# Patient Record
Sex: MALE | Race: WHITE | NOT HISPANIC OR LATINO | Employment: FULL TIME | URBAN - METROPOLITAN AREA
[De-identification: names, ages, dates, MRNs, and addresses within clinical notes are randomized per-mention and may not be internally consistent; named-entity substitution may affect disease eponyms.]

---

## 2023-07-07 ENCOUNTER — HOSPITAL ENCOUNTER (OUTPATIENT)
Dept: DATA CONVERSION | Facility: HOSPITAL | Age: 43
End: 2023-07-08
Attending: ORTHOPAEDIC SURGERY | Admitting: ORTHOPAEDIC SURGERY
Payer: COMMERCIAL

## 2023-07-07 DIAGNOSIS — M17.11 UNILATERAL PRIMARY OSTEOARTHRITIS, RIGHT KNEE: ICD-10-CM

## 2023-07-07 DIAGNOSIS — M17.10 UNILATERAL PRIMARY OSTEOARTHRITIS, UNSPECIFIED KNEE: ICD-10-CM

## 2023-07-08 LAB
ANION GAP IN SER/PLAS: 11 MMOL/L (ref 10–20)
BASOPHILS (10*3/UL) IN BLOOD BY AUTOMATED COUNT: 0.01 X10E9/L (ref 0–0.1)
BASOPHILS/100 LEUKOCYTES IN BLOOD BY AUTOMATED COUNT: 0.1 % (ref 0–2)
CALCIUM (MG/DL) IN SER/PLAS: 8.7 MG/DL (ref 8.6–10.3)
CARBON DIOXIDE, TOTAL (MMOL/L) IN SER/PLAS: 25 MMOL/L (ref 21–32)
CHLORIDE (MMOL/L) IN SER/PLAS: 105 MMOL/L (ref 98–107)
CREATININE (MG/DL) IN SER/PLAS: 1.06 MG/DL (ref 0.5–1.3)
EOSINOPHILS (10*3/UL) IN BLOOD BY AUTOMATED COUNT: 0.01 X10E9/L (ref 0–0.7)
EOSINOPHILS/100 LEUKOCYTES IN BLOOD BY AUTOMATED COUNT: 0.1 % (ref 0–6)
ERYTHROCYTE DISTRIBUTION WIDTH (RATIO) BY AUTOMATED COUNT: 11.9 % (ref 11.5–14.5)
ERYTHROCYTE MEAN CORPUSCULAR HEMOGLOBIN CONCENTRATION (G/DL) BY AUTOMATED: 33.3 G/DL (ref 32–36)
ERYTHROCYTE MEAN CORPUSCULAR VOLUME (FL) BY AUTOMATED COUNT: 87 FL (ref 80–100)
ERYTHROCYTES (10*6/UL) IN BLOOD BY AUTOMATED COUNT: 4.71 X10E12/L (ref 4.5–5.9)
GFR MALE: 90 ML/MIN/1.73M2
GLUCOSE (MG/DL) IN SER/PLAS: 121 MG/DL (ref 74–99)
HEMATOCRIT (%) IN BLOOD BY AUTOMATED COUNT: 40.8 % (ref 41–52)
HEMOGLOBIN (G/DL) IN BLOOD: 13.6 G/DL (ref 13.5–17.5)
IMMATURE GRANULOCYTES/100 LEUKOCYTES IN BLOOD BY AUTOMATED COUNT: 0.4 % (ref 0–0.9)
LEUKOCYTES (10*3/UL) IN BLOOD BY AUTOMATED COUNT: 16.5 X10E9/L (ref 4.4–11.3)
LYMPHOCYTES (10*3/UL) IN BLOOD BY AUTOMATED COUNT: 1.11 X10E9/L (ref 1.2–4.8)
LYMPHOCYTES/100 LEUKOCYTES IN BLOOD BY AUTOMATED COUNT: 6.7 % (ref 13–44)
MONOCYTES (10*3/UL) IN BLOOD BY AUTOMATED COUNT: 1.1 X10E9/L (ref 0.1–1)
MONOCYTES/100 LEUKOCYTES IN BLOOD BY AUTOMATED COUNT: 6.7 % (ref 2–10)
NEUTROPHILS (10*3/UL) IN BLOOD BY AUTOMATED COUNT: 14.19 X10E9/L (ref 1.2–7.7)
NEUTROPHILS/100 LEUKOCYTES IN BLOOD BY AUTOMATED COUNT: 86 % (ref 40–80)
PLATELETS (10*3/UL) IN BLOOD AUTOMATED COUNT: 261 X10E9/L (ref 150–450)
POTASSIUM (MMOL/L) IN SER/PLAS: 4.1 MMOL/L (ref 3.5–5.3)
SODIUM (MMOL/L) IN SER/PLAS: 137 MMOL/L (ref 136–145)
UREA NITROGEN (MG/DL) IN SER/PLAS: 12 MG/DL (ref 6–23)

## 2023-09-30 NOTE — H&P
History & Physical Reviewed:   I have reviewed the History and Physical dated:  02-Jul-2023   History and Physical reviewed and relevant findings noted. Patient examined to review pertinent physical  findings.: No significant changes   Home Medications Reviewed: no changes noted   Allergies Reviewed: no changes noted     Consent:   COVID-19 Consent:  ·  COVID-19 Risk Consent Surgeon has reviewed key risks related to the risk of ksenia COVID-19 and if they contract COVID-19 what the risks are.       Electronic Signatures:  Benji Alfaro)  (Signed 07-Jul-2023 12:41)   Authored: History & Physical Reviewed, Consent, Note  Completion      Last Updated: 07-Jul-2023 12:41 by Benji Alfaro)

## 2023-09-30 NOTE — DISCHARGE SUMMARY
Send Summary:   Discharge Summary Providers:  Provider Role Provider Name   · Referring Lynsey Farmer   · Attending Benji Gerard   · Referring Benji Gerard   · Primary Lynsey Farmer       Note Recipients: Benji Gerard MD - [id number] [PREFERRED]  Lynsey Farmer, APRN-CNP - [id number] []       Discharge:    Summary:   Admission Date: .07-Jul-2023 08:34:00   Discharge Date: 08-Jul-2023   Attending Physician at Discharge: Benji Gerard   Admission Reason: Right knee pain   Final Discharge Diagnoses: Osteoarthritis, knee   Procedures: Date: 07-Jul-2023 14:02:00  Procedure Name: 1. RIGHT DEON UNICONDYLAR MEDIAL KNEE REPLACEMENT   Condition at Discharge: Satisfactory   Disposition at Discharge: Home Health Care - New   Vital Signs:        T   P  R  BP   MAP  SpO2   Value  36.3  87  16  144/83   104  98%  Date/Time 7/8 7:23 7/8 7:23 7/8 7:23 7/8 7:23  7/8 7:23 7/8 7:23  Range  (36C - 36.6C )  (76 - 97 )  (16 - 18 )  (120 - 144 )/ (80 - 83 )  (96 - 104 )  (95% - 98% )    Date:            Weight/Scale Type:  Height:   07-Jul-2023 08:58  84.6  kg / standing       Hospital Course:    42-year-old male came to the office complaining of right knee pain.  After discussing risk versus benefits brought in for Deon assisted partial knee replacement.   Tolerated the procedure well.  Plan to discharge home with home physical therapy    Follow up with Dr Gerard in 2 weeks for wound check  Weight bearing as tolerated  May shower allowing water to run over incision and pat dry. Do Not wash or scrub incision  May shower with Aquacel over incision  Remove Aquacel 7/14/23  Incentive Spirometry 10x every hour while awake x2 weeks  Surgical hose x3 weeks removing only for skin care and hygiene  Ice to right knee 20 minutes every hour  If incision begins to drain call office right away    Immunizations:    Immunizations:  23-Nov-2021   SARS-CoV-2 (COVID-19): Immunizations, 23-Nov-2021 26-Jan-2021   SARS-CoV-2  (COVID-19): Immunizations, 26-Glen-2021  28-Dec-2020   SARS-CoV-2 (COVID-19): Immunizations, 28-Dec-2020      Discharge Information:    and Continuing Care:   Lab Results - Pending:    None  Radiology Results - Pending: None   Discharge Instructions:    Activity:           activity as tolerated.          May shower..            May not return to school/work until follow-up visit with.            May not drive until follow-up visit.            No pushing, pulling, or lifting objects greater than 5 pounds.            Weight-bearing Instructions: weight-bearing as tolerated right leg.            May shower allowing water to run over incision and pat dry. DO NOT wash or scrub incision    Nutrition/Diet:           resume normal diet    Wound Care:           Wound Site:   right knee          Wound Type:   surgical incision          Change Dressing:   Aquacel like a Band-Aid on 7/14/2023          Instructions:   no lotions, creams, or tub soaks    Additional Orders:           Additional Instructions:   Follow up with Dr Gerard in 2 weeks for wound check  Weight bearing as tolerated  May shower allowing water to run over incision and pat dry. Do Not wash or scrub incision  May shower with Aquacel over incision  Remove Aquacel 7/14/23  Incentive Spirometry 10x every hour while awake x2 weeks  Surgical hose x3 weeks removing only for skin care and hygiene  Ice to right knee 20 minutes every hour  If incision begins to drain call office right away    Home Care Certification:           Home Care Agency:    [facility] [phone]          Skilled Disciplines Ordered:   PT,  OT    Home Care Services:           Home Care Skilled Service:   Rehab (PT/OT/SP eval and treat)    Follow Up Appointments:    Follow-Up Appointment 01:           Physician/Dept/Service:   Dr. Gerard as scheduled          Reason for Referral:   Right knee          Call to Schedule in:   2 weeks          Location:   [address] [place]  [facility]          Phone Number:   [phone]    Discharge Medications: Home Medication   Adderall 20 mg oral tablet - 1 tab(s) orally once a day  PriLOSEC - orally once a day  aspirin 81 mg oral delayed release tablet - 1 tab(s) orally 2 times a day x 30 days      PRN Medication   docusate sodium 100 mg oral capsule - 1 cap(s) orally 2 times a day x 30 days, As Needed for constipation  cyclobenzaprine 10 mg oral tablet - 1 tab(s) orally every 8 hours x 14 days, As Needed   oxyCODONE 5 mg oral tablet - 1 tab(s) orally every 4 hours x 7 days, As Needed   ondansetron 4 mg oral tablet, disintegrating - 1 tab(s) orally every 8 hours x 7 days, As Needed   mirtazapine - orally once a day (at bedtime), As Needed  acetaminophen 325 mg oral tablet - 2 tab(s) orally every 4 hours, As needed  Ambien - orally once a day (at bedtime), As Needed     DNR Status:   ·  Code Status Code Status order at time of discharge: Full Code       Electronic Signatures:  Richie Douglas (APRN-CNP)  (Signed 08-Jul-2023 11:43)   Authored: Send Summary, Summary Content, Immunizations,  Ongoing Care, DNR Status, Note Completion      Last Updated: 08-Jul-2023 11:43 by Richie Douglas ([facility]-CNP)

## 2023-09-30 NOTE — PROGRESS NOTES
Service: Orthopaedics     Subjective Data:   CELINE COLBY is a 42 year old Male who is Hospital Day # 2 and POD #1 for 1. RIGHT GLORIA UNICONDYLAR MEDIAL KNEE REPLACEMENT ;    Overnight Events: Patient had an uneventful night.     Objective Data:     Objective Information:      T   P  R  BP   MAP  SpO2   Value  36.3  87  16  144/83   104  98%  Date/Time 7/8 7:23 7/8 7:23 7/8 7:23 7/8 7:23  7/8 7:23 7/8 7:23  Range  (36C - 36.6C )  (76 - 97 )  (16 - 18 )  (120 - 144 )/ (80 - 83 )  (96 - 104 )  (95% - 98% )      Pain reported at 7/8 8:15: 0 = None      T   P  R  BP   MAP  SpO2   Value  36.3  87  16  144/83   104  98%  Date/Time 7/8 7:23 7/8 7:23 7/8 7:23 7/8 7:23  7/8 7:23 7/8 7:23  Range  (36C - 36.6C )  (76 - 97 )  (16 - 18 )  (120 - 144 )/ (80 - 83 )  (96 - 104 )  (95% - 98% )        Pain reported at 7/8 8:15: 0 = None    Physical Exam by System:    Eyes: PERRL, EOMI, clear sclera   ENMT: mucous membranes moist, no apparent injury,  no lesions seen   Head/Neck: Neck supple, no apparent injury, thyroid  without mass or tenderness, No JVD, trachea midline, no bruits   Respiratory/Thorax: Patent airways, CTAB, normal  breath sounds with good chest expansion, thorax symmetric   Cardiovascular: Regular, rate and rhythm, no murmurs,  2+ equal pulses of the extremities, normal S 1and S 2   Gastrointestinal: Nondistended, soft, non-tender,  no rebound tenderness or guarding, no masses palpable, no organomegaly, +BS, no bruits   Extremities: Right knee dressing clean and dry  Good sensation capillary refill  2+ pulses   Neurological: alert and oriented x3, intact senses,  motor, response and reflexes, normal strength   Skin: Warm and dry, no lesions, no rashes     Medication:    Medications:          Continuous Medications       --------------------------------    1. Lactated Ringers Infusion:  1000  mL  IntraVenous  <Continuous>    2. Lactated Ringers Infusion:  1000  mL  IntraVenous  <Continuous>          Scheduled Medications       --------------------------------    1. Acetaminophen:  650  mg  Oral  Every 6 Hours    2. Amphetamine - Dextroamphetamine:  20  mg  Oral  Daily    3. Aspirin Enteric Coated:  81  mg  Oral  2 Times a Day    4. Docusate:  100  mg  Oral  2 Times a Day    5. Pantoprazole:  40  mg  Oral  Daily    6. Polyethylene Glycol:  17  gram(s)  Oral  2 Times a Day         PRN Medications       --------------------------------    1. Cyclobenzaprine:  10  mg  Oral  3 Times a Day    2. diphenhydrAMINE Injectable:  12.5  mg  IntraVenous Push  Every 6 Hours    3. Ketorolac Injectable:  15  mg  IntraVenous Push  Every 6 Hours    4. Magnesium Hydroxide -Al Hydrox -Simethicone Oral Liquid:  30  mL  Oral  Every 6 Hours    5. Morphine Injectable:  2  mg  IntraVenous Push  Every 4 Hours    6. Ondansetron Injectable:  4  mg  IntraVenous Push  Every 6 Hours    7. oxyCODONE Immediate Release:  5  mg  Oral  Every 4 Hours    8. oxyCODONE Immediate Release:  10  mg  Oral  Every 4 Hours    9. Promethazine IV Piggy Back:  12.5  mg  IntraVenous Piggyback  Every 6 Hours    10. Zolpidem:  5  mg  Oral  At Bedtime        Recent Lab Results:    Results:    CBC: 7/8/2023 05:51              \     Hgb     /                              \     13.6       /  WBC  ----------------  Plt               16.5 H    ----------------    261              /     Hct     \                              /     40.8 L    \            RBC: 4.71     MCV: 87     Neutrophil %: 86.0      BMP: 7/8/2023 05:51  NA+        Cl-     BUN  /                         137    105    12  /  --------------------------------  Glucose                ---------------------------  121 H    K+     HCO3-   Creat \                         4.1  25    1.06  \  Calcium : 8.7     Anion Gap : 11        I have reviewed these laboratory results:    Complete Blood Count + Differential  08-Jul-2023 05:51:00      Result Value    White Blood Cell Count  16.5   H   Red Blood Cell  Count  4.71    HGB  13.6    HCT  40.8   L   MCV  87    MCHC  33.3    PLT  261    RDW-CV  11.9    Neutrophil %  86.0    Immature Granulocytes %  0.4    Lymphocyte %  6.7    Monocyte %  6.7    Eosinophil %  0.1    Basophil %  0.1    Neutrophil Count  14.19   H   Lymphocyte Count  1.11   L   Monocyte Count  1.10   H   Eosinophil Count  0.01    Basophil Count  0.01      Basic Metabolic Panel  08-Jul-2023 05:51:00      Result Value    Glucose, Serum  121   H   NA  137    K  4.1    CL  105    Bicarbonate, Serum  25    Anion Gap, Serum  11    BUN  12    CREAT  1.06    GFR Male  90    Calcium, Serum  8.7        Assessment and Plan:   Code Status:  ·  Code Status Full Code     Assessment:    Subjective: No acute events overnight. Pain controlled. Denies chest pain/shortness of breath. 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  Right knee dressing clean, dry, intact good sensation capillary refill  Vitals reviewed    No acute distress, breathing comfortably  Operative extremity: Dressing clean/dry/intact. Motor and sensory intact distally. Calves soft and non-tender. Toes warm and perfused.    Impression: s/p TKA.  Day 1    Plan:   1. Pain control  2. WBAT  3. PT/OT  4. DVT prophylaxisAspirin 81 mg p.o. twice daily for 30 days  5. Encourage incentive spirometry  6. Appreciate Hospitalist medical management  7. Discharge planning home with home physical therapy      Electronic Signatures:  Richie Douglas ([facility]-CNP)  (Signed 08-Jul-2023 09:38)   Authored: Service, Subjective Data, Objective Data, Assessment  and Plan, Note Completion      Last Updated: 08-Jul-2023 09:38 by Richie Douglas (APRN-CNP)

## 2023-10-01 PROBLEM — G43.109 OCULAR MIGRAINE: Status: ACTIVE | Noted: 2023-10-01

## 2023-10-01 PROBLEM — F90.0 ATTENTION DEFICIT HYPERACTIVITY DISORDER (ADHD), PREDOMINANTLY INATTENTIVE TYPE: Status: ACTIVE | Noted: 2023-10-01

## 2023-10-01 PROBLEM — M25.661 STIFFNESS OF RIGHT KNEE, NOT ELSEWHERE CLASSIFIED: Status: ACTIVE | Noted: 2023-10-01

## 2023-10-01 PROBLEM — S62.306A FRACTURE OF FIFTH METACARPAL BONE OF RIGHT HAND: Status: ACTIVE | Noted: 2022-03-31

## 2023-10-01 PROBLEM — G47.26 CIRCADIAN RHYTHM SLEEP DISORDER, SHIFT WORK TYPE: Status: ACTIVE | Noted: 2023-10-01

## 2023-10-01 PROBLEM — Z86.69 H/O RETINAL DETACHMENT: Status: ACTIVE | Noted: 2023-10-01

## 2023-10-01 PROBLEM — M17.11 PRIMARY OSTEOARTHRITIS OF RIGHT KNEE: Status: ACTIVE | Noted: 2023-10-01

## 2023-10-01 RX ORDER — MIRTAZAPINE 15 MG/1
15 TABLET, FILM COATED ORAL NIGHTLY
COMMUNITY

## 2023-10-01 RX ORDER — HYDROCODONE BITARTRATE AND ACETAMINOPHEN 5; 325 MG/1; MG/1
1 TABLET ORAL EVERY 4 HOURS PRN
COMMUNITY
Start: 2022-03-31

## 2023-10-01 RX ORDER — KETOCONAZOLE 20 MG/ML
SHAMPOO, SUSPENSION TOPICAL
COMMUNITY
Start: 2022-10-18

## 2023-10-01 RX ORDER — OXYCODONE AND ACETAMINOPHEN 5; 325 MG/1; MG/1
1 TABLET ORAL EVERY 12 HOURS PRN
COMMUNITY

## 2023-10-01 RX ORDER — ONDANSETRON 4 MG/1
TABLET, ORALLY DISINTEGRATING ORAL
COMMUNITY
Start: 2023-05-04

## 2023-10-02 NOTE — OP NOTE
PROCEDURE DETAILS    Preoperative Diagnosis:  Primary localized osteoarthritis of right knee, M17.11    Postoperative Diagnosis:  Primary localized osteoarthritis of right knee, M17.11    Surgeon: Benji Alfaro  Resident/Fellow/Other Assistant: Ezequiel Marroquin    Procedure:  1. RIGHT DEON UNICONDYLAR MEDIAL KNEE REPLACEMENT     Anesthesia: See notes   Estimated Blood Loss: 50 ml   Findings: see op report         Operative Report:   Implants: Myke Deon size 4 femur, size 4 tibia, 8 tibial insert  Operative findings:  Severe unicompartmental degenerative joint disease, intact ACL     Operative Indications:  Failed multiple non-surgical modalities and great difficulty performing activities of daily living as indicated in pre-operative notes.       Operative Procedure:  Patient was met prior to surgery in pre-operative holding.  The appropriate extremity was marked and recent health status was reviewed and we found no contraindication to proceeding with the scheduled procedure.  We again reviewed the risks of infection,  wound issues, deep venous thrombosis, pulmonary embolism, medical complications including cardiac and pulmonary, neurologic and vascular injury and incomplete relief of pre-operative pain.     The patient was transported to the operating room.  A thigh tourniquet was placed and a small bump under the buttock.  The patient was resting comfortably in a supine position with all cristine prominences well padded.  Sequential compression device was  placed on the contralateral lower extremity.  An exam under anesthesia was performed to evaluate the patient´s range of motion and ligamentous integrity.       The patient was prepped and draped in the usual sterile fashion.  The leg was placed in a leg boykin after appropriate padding.  After prep, drape, antibiotic and time out, the leg was exsanguinated and the tourniquet inflated.  A longitudinal incision  was made over the anterior knee utilizing the  old anterior medial incision, slightly medial to midline.  The dissection was carried out down through the skin and subcutaneous tissue.  A medial parapatellar arthrotomy was performed.  We confirmed the patient  was a candidate for a unicompartmental knee with intact ACL and wear isolated to the medial compartment.  Minimal fat pad was resected and a retractor was placed medially to protect the MCL.     We then placed two pins distal to the medial joint line in the tibia (intra-incisional) unicortical directed in a posterolateral direction.  We placed two pins proximal and medial in the femur within the incision.  Two arrays were placed and the registration  and check points were completed.     The checkpoints were then registered on the femur and the tibia.  Once this was complete we used the spacers, hamilton elevator and manual stress to confirm ideal balancing and resection.  Once this was completed we adjusted the resection and rotation to  match our pre-operative plan.     The robot was draped in sterile fashion and brought into the field.  The saw was registered and check points confirmed.  The saw was used to resect the tibia and distal femur.  The tristan was exchanged and registered.  The tristan was used for the distal femur  and champfer cut.  The  holes were drilled in the femur and tibia.  Retractors were used to protect the MCL and patellar tendon.     After the tibial cut was completed, we removed the remaining menisci.  Trials were placed and had an excellent fit.   The knee was taken through a range of motion and both flexion and extension did not appear overly taut.  There was no laxity to coronal  stress. The final poly was  8 mm.    The capsule was injected with long acting local anesthetic.     The cement (StIkaria simplex) was mixed in a vacuum sealed fashion and the bone was pulsatile lavaged.   The bone was dried and the implants were placed. The excess cement was removed.   After the cement dried  the gap balancing was reassessed prior to placing  the final articular surface.  The knee was then copiously lavaged with normal saline.  The tourniquet was taken down and hemostasis was obtained using Bovie electrocautery and tranexamic acid (per protocol).  No significant bleeders were encountered and  the usage of a drain was not felt to be necessary.     A layered closure was performed using 1 Vicryl in the retinacular layer and quadriceps tendon oversewn with # 1 Stratafix PDS.  2-0 vicryl in the deep dermal layer and  3-0 monocryl in the skin.  An adherent, water proof dressing was placed followed by  Webril and an ace bandage. All counts were reported as correct.  The patient was stable to the post anesthesia care unit.     The surgery was completed with the assistance of a PA.  I was present for all portions of the procedure from opening/exposure through deep wound closure.  The superficial layer was closed indirectly by the PA. No qualified resident or fellow was available  to assist.     Estimated blood loss: 50 ml  Specimens:  None  Complications:   None    The physician assistant was present for the entire case.  Given the nature of the procedure and disease process a skilled surgical assistant was  necessary for the case.  The assistant was necessary for retraction and helped directly facilitate completion of the surgery.  A certified scrub tech was at the back table managing instruments and supplies for the surgical procedure.  Note Recipients:   Benji Alfaro MD - [id number] [PREFERRED]  Lynsey Farmer APRN-CNP - [id number] []                        Attestation:   Note Completion:  Attending Attestation I performed the procedure without a resident         Electronic Signatures:  Benji Alfaro)  (Signed 07-Jul-2023 14:03)   Authored: Post-Operative Note, Chart Review, Note Completion      Last Updated: 07-Jul-2023 14:03 by Benji Alfaro)

## 2023-10-03 ENCOUNTER — TREATMENT (OUTPATIENT)
Dept: PHYSICAL THERAPY | Facility: CLINIC | Age: 43
End: 2023-10-03
Payer: COMMERCIAL

## 2023-10-03 ENCOUNTER — APPOINTMENT (OUTPATIENT)
Dept: ORTHOPEDIC SURGERY | Facility: CLINIC | Age: 43
End: 2023-10-03
Payer: COMMERCIAL

## 2023-10-03 DIAGNOSIS — M25.661 STIFFNESS OF RIGHT KNEE, NOT ELSEWHERE CLASSIFIED: Primary | ICD-10-CM

## 2023-10-03 DIAGNOSIS — M25.561 RIGHT KNEE PAIN, UNSPECIFIED CHRONICITY: ICD-10-CM

## 2023-10-03 PROCEDURE — 97110 THERAPEUTIC EXERCISES: CPT | Mod: GP

## 2023-10-03 ASSESSMENT — ENCOUNTER SYMPTOMS
LOSS OF SENSATION IN FEET: 0
DEPRESSION: 0
OCCASIONAL FEELINGS OF UNSTEADINESS: 0

## 2023-10-03 ASSESSMENT — PAIN SCALES - GENERAL: PAINLEVEL_OUTOF10: 2

## 2023-10-03 ASSESSMENT — PAIN - FUNCTIONAL ASSESSMENT: PAIN_FUNCTIONAL_ASSESSMENT: 0-10

## 2023-10-03 NOTE — PROGRESS NOTES
"Physical Therapy    Physical Therapy Treatment    Patient Name: Jin Watts  MRN: [id number]  Today's Date: 10/3/2023  Time Calculation  Start Time: 1553  Stop Time: 1640  Time Calculation (min): 47 min  Visit number: 7 (6 of 10)   10% coins, $3100 Ded, 30 PT/OT vs/yr      Assessment:   Pt tolerates session well without adverse effect and is making progress toward goals. Pt demo's improvement in quad strength as noted with improved ease with lateral step downs compared to last visit. However, does benefit from cues to improve weight shift over affected LE during B LE activities. He will continue to benefit from PT working on restoring ROM and strength to R LE.     Plan:   Review lateral step downs & posterior step downs.   As able:   Reverse lunge with slider  RDLs  clamshells with band, continue monster walks, etc.  Split squats     Current Problem  1. Stiffness of right knee, not elsewhere classified  Follow Up In Physical Therapy      2. Right knee pain, unspecified chronicity  Follow Up In Physical Therapy          Subjective   General   Pt arrives at session s/p R GLORIA unicondylar medial knee replacement 7/7/23 (12.4 weeks post-op today).    Pt states he does feel his R knee is getting stronger, but feels his R side from waist down feels very tight. Feels like a \"rubber band\" tightness. Stretching helps, however will return after 20 minutes of sitting. Has been working on HEP. Has been to the gym once since last visit.     Precautions  Precautions  STEADI Fall Risk Score (The score of 4 or more indicates an increased risk of falling): 0  Pain  Pain Assessment: 0-10  Pain Score: 2  Pain Location: Back  Pain Orientation: Lower    Objective   Extremity/Trunk Assessment  Pt with tendency to weight shift away from affected LE with squat form. Able to correct with cues.   Noted improved hip hinge this date with squat form.     Treatments:  Therapeutic Exercise (64114): 47 minutes  Wall slides 3x10  Heels propped " "squats 3x10-15    Squats with greater hip hinge 3x10-15     Step ups/downs with R LE 3x10 at 6\" step  Lateral step downs at 6\" step, partial depth with R LE and L UE hand support on single // bar. Cues to maintain neutral pelvis position.     Side planks 2x30 sec holds B     Current HEP:   Supine quad set + SLR  Heel prop knee extension stretch + quad set  Supine heel slides with strap  Standing TKEs with green TB   Wall slides  Tib anterior strengthening at wall   Calf raises off edge of step  Squats   Knee extension & HS curl & leg press at gym   Hip ADD stretch in kneeling    "

## 2023-10-16 NOTE — PROGRESS NOTES
"Physical Therapy    Physical Therapy Treatment    Patient Name: Jin Watts  MRN: [id number]  Today's Date: 10/16/2023     Visit number: 8 (7 of 10)   10% coins, $3100 Ded, 30 PT/OT vs/yr      Assessment:   Pt tolerates session well without adverse effect and is making progress toward goals.     Pt demo's improvement in quad strength as noted with improved ease with lateral step downs compared to last visit. However, does benefit from cues to improve weight shift over affected LE during B LE activities. He will continue to benefit from PT working on restoring ROM and strength to R LE.     Plan:   Review lateral step downs & posterior step downs.   As able:       Current Problem  No diagnosis found.      Subjective   General   Pt arrives at session s/p R GLORIA unicondylar medial knee replacement 7/7/23 (14.5 weeks post-op today).    Pt states     he does feel his R knee is getting stronger, but feels his R side from waist down feels very tight. Feels like a \"rubber band\" tightness. Stretching helps, however will return after 20 minutes of sitting. Has been working on HEP. Has been to the gym once since last visit.     Precautions     Pain       Objective   Extremity/Trunk Assessment  Pt with tendency to weight shift away from affected LE with squat form. Able to correct with cues.   Noted improved hip hinge this date with squat form.     Treatments:  Therapeutic Exercise (85744): 47 minutes  Wall slides 3x10  Heels propped squats 3x10-15    Squats with greater hip hinge 3x10-15     Step ups/downs with R LE 3x10 at 6\" step  Lateral step downs at 6\" step, partial depth with R LE and L UE hand support on single // bar. Cues to maintain neutral pelvis position.     Side planks 2x30 sec holds B     []  Reverse lunge with slider  RDLs  clamshells with band, continue monster walks, etc.  Split squats     Current HEP:   Supine quad set + SLR  Heel prop knee extension stretch + quad set  Supine heel slides with " strap  Standing TKEs with green TB   Wall slides  Tib anterior strengthening at wall   Calf raises off edge of step  Squats   Knee extension & HS curl & leg press at gym   Hip ADD stretch in kneeling

## 2023-10-18 ENCOUNTER — APPOINTMENT (OUTPATIENT)
Dept: PHYSICAL THERAPY | Facility: CLINIC | Age: 43
End: 2023-10-18
Payer: COMMERCIAL

## 2023-10-25 ENCOUNTER — APPOINTMENT (OUTPATIENT)
Dept: PHYSICAL THERAPY | Facility: CLINIC | Age: 43
End: 2023-10-25
Payer: COMMERCIAL

## 2023-11-13 NOTE — PROGRESS NOTES
Physical Therapy    Physical Therapy Treatment    Patient Name: Jin Watts  MRN: [id number]  Today's Date: 11/14/2023  Time Calculation  Start Time: 1150  Stop Time: 1230  Time Calculation (min): 40 min  Visit number: 8 (7 of 10)   10% coins, $3100 Ded, 30 PT/OT vs/yr      Assessment:   Pt tolerates session well without adverse effect and is making slowed progress toward goals. Per subjective, pt has performed less of his HEP since last visit ~5 weeks ago. Pt did fatigue rapidly, especially with quadriceps strengthening. He will continue to benefit from PT working on restoring ROM and strength to R LE.     Plan:   Review Y balance lunges, single leg calf raise & single leg RDLs.   As able:   Reverse lunge with slider  Split squats   clamshells with band, continue monster walks, etc.    Current Problem  1. Stiffness of right knee, not elsewhere classified        2. Right knee pain, unspecified chronicity              Subjective   General   Pt arrives at session s/p R GLORIA unicondylar medial knee replacement 7/7/23 (4 months post-op)     Pt states he has been very busy with work and this has impacted his ability to perform home exercise program exercises as well as has not been going to the gym. Therefore feels his strength may have regressed a bit since last visit. Planning to begin skiiing in 1/2024. Since last visit did pull a muscle of his inner thigh, however has improved.     Precautions  Precautions  STEADI Fall Risk Score (The score of 4 or more indicates an increased risk of falling): 0  Pain  Pain Assessment: 0-10  Pain Score: 0 - No pain    Objective   Extremity/Trunk Assessment  Pt with tendency to weight shift away from affected LE with squat form. Able to correct with cues.   Noted improved hip hinge this date with squat form.     Treatments:  Therapeutic Exercise (66685): 40 minutes  Squats x10   Squats with green TB resistance at knees 2x10-15, cues for improved weight shift over affected LE  Heels  "propped squats with green TB resistance 3x10-15    Y balance lunges with R LE in stance 2x10 reps, very challenged with this exercise    Single leg calf raises with R LE 2x25 reps    Lateral step downs at 6\" step with R LE in stance 2x10 reps     Single leg RDL with R LE in stance x10 reps     Current HEP:   Supine quad set + SLR  Heel prop knee extension stretch + quad set  Supine heel slides with strap  Standing TKEs with green TB   Wall slides  Tib anterior strengthening at wall   Calf raises off edge of step  Squats   Knee extension & HS curl & leg press at gym   Hip ADD stretch in kneeling  Y balance lunges (added today)   Single leg calf raise (added today)  Single leg RDL (added today)   "

## 2023-11-14 ENCOUNTER — TREATMENT (OUTPATIENT)
Dept: PHYSICAL THERAPY | Facility: CLINIC | Age: 43
End: 2023-11-14
Payer: COMMERCIAL

## 2023-11-14 DIAGNOSIS — M25.561 RIGHT KNEE PAIN, UNSPECIFIED CHRONICITY: ICD-10-CM

## 2023-11-14 DIAGNOSIS — M25.661 STIFFNESS OF RIGHT KNEE, NOT ELSEWHERE CLASSIFIED: Primary | ICD-10-CM

## 2023-11-14 PROCEDURE — 97110 THERAPEUTIC EXERCISES: CPT | Mod: GP

## 2023-11-14 ASSESSMENT — PAIN SCALES - GENERAL: PAINLEVEL_OUTOF10: 0 - NO PAIN

## 2023-11-14 ASSESSMENT — PAIN - FUNCTIONAL ASSESSMENT: PAIN_FUNCTIONAL_ASSESSMENT: 0-10

## 2023-11-27 ENCOUNTER — TREATMENT (OUTPATIENT)
Dept: PHYSICAL THERAPY | Facility: CLINIC | Age: 43
End: 2023-11-27
Payer: COMMERCIAL

## 2023-11-27 DIAGNOSIS — M25.661 STIFFNESS OF RIGHT KNEE, NOT ELSEWHERE CLASSIFIED: Primary | ICD-10-CM

## 2023-11-27 DIAGNOSIS — M25.561 RIGHT KNEE PAIN, UNSPECIFIED CHRONICITY: ICD-10-CM

## 2023-11-27 PROCEDURE — 97110 THERAPEUTIC EXERCISES: CPT | Mod: GP

## 2023-11-27 ASSESSMENT — PAIN - FUNCTIONAL ASSESSMENT: PAIN_FUNCTIONAL_ASSESSMENT: 0-10

## 2023-11-27 ASSESSMENT — PAIN SCALES - GENERAL: PAINLEVEL_OUTOF10: 0 - NO PAIN

## 2023-11-27 NOTE — PROGRESS NOTES
Physical Therapy    Physical Therapy Treatment    Patient Name: Jin Watts  MRN: [id number]  Today's Date: 11/27/2023  Time Calculation  Start Time: 1155  Stop Time: 1239  Time Calculation (min): 44 min  Visit number: 9 (8 of 10)   10% coins, $3100 Ded, 30 PT/OT vs/yr      Assessment:   Pt tolerates session well without adverse effect and is making slowed progress toward goals. Pt challenged with dynamic foot/ankle stability over affected side as well as hip/quad strength. Cues to control dynamic valgus throughout session, and remainder of session spent on hip strengthening and control. He will continue to benefit from PT working on restoring ROM and strength to R LE.     Plan:   Review Y balance lunges, single leg calf raise & single leg RDLs.   As able:   Reverse lunge with slider  Side planks   Split squats     Current Problem  1. Stiffness of right knee, not elsewhere classified        2. Right knee pain, unspecified chronicity            Subjective   General   Pt arrives at session s/p R GLORIA unicondylar medial knee replacement 7/7/23 (4 months post-op)     Pt states he is doing well. Has been very busy with work. Continues to plan to begin skiing in 1/2024. Can experience tightness of his adductor region and pain of lateral posterior knee. Notices this pain is worse with more sedentary activity, feels better with activity.     Precautions  Precautions  STEADI Fall Risk Score (The score of 4 or more indicates an increased risk of falling): 0  Pain  Pain Assessment: 0-10  Pain Score: 0 - No pain    Objective   Extremity/Trunk Assessment  Pt with tendency to weight shift away from affected LE with squat form. Able to correct with cues.   Noted improved hip hinge this date with squat form.     Treatments:  Therapeutic Exercise (99713): 44 minutes  Reviewed:   Y balance lunges with R LE in stance 2x10 reps  Single leg calf raises with R LE x25 reps, progressed to heel off edge of step x25   Single leg RDL with  "R LE in stance x10 reps     Lateral step downs at 6\" step with R LE in stance 2x10 reps     Monster walks in 20ft hallway x4 laps with green TB resistance. Cues for hip hinge.   Clamshells with green TB 2x10-12 B     Current HEP:   Supine quad set + SLR  Heel prop knee extension stretch + quad set  Supine heel slides with strap  Standing TKEs with green TB   Wall slides  Tib anterior strengthening at wall   Calf raises off edge of step  Squats   Knee extension & HS curl & leg press at gym   Hip ADD stretch in kneeling  Y balance lunges   Single leg calf raise, heel off edge of step   Single leg RDL   "

## 2023-12-06 NOTE — PROGRESS NOTES
Physical Therapy    Physical Therapy Treatment    Patient Name: Jin Watts  MRN: [id number]  Today's Date: 12/7/2023  Time Calculation  Start Time: 1020  Stop Time: 1106  Time Calculation (min): 46 min  Visit number: 10 (9 of 10)   10% coins, $3100 Ded, 30 PT/OT vs/yr      Assessment:   Pt tolerates session well without adverse effect and is making slow but steady progress toward goals. Pt demo's improved tolerance and overall muscular endurance with his various exercises this date. Continues to be challenged to have appropriate dynamic stability throughout standing LE strengthening. He will continue to benefit from PT working on restoring ROM and strength to R LE.     Plan:     As able:   Single leg wall slide   Clamshells with green TB   Side planks   Split squats   Cybex single leg knee extension-- eccentric focus   Cybex HS curls  Cybex leg press     Current Problem  1. Stiffness of right knee, not elsewhere classified        2. Right knee pain, unspecified chronicity            Subjective   General   Pt arrives at session s/p R GLORIA unicondylar medial knee replacement 7/7/23 (5 months post-op)     Pt states he has been able to work on his HEP more due to having more time at home with less busy work schedule this past week. Has not been going to the gym to work on specific machine strengthening.   Pt reports he has been able to work on more explosive activity/jumping off of R LE in the pool. Has also been working on push off quickly of R LE when ascending stairs.   He reports his first ski trip is scheduled for the beginning of 1/2024.     Precautions  Precautions  STEADI Fall Risk Score (The score of 4 or more indicates an increased risk of falling): 0  Pain  Pain Assessment: 0-10  Pain Score: 0 - No pain    Objective   Extremity/Trunk Assessment  Pt with tendency to weight shift away from affected LE with squat form. Able to correct with cues.     Treatments:  Therapeutic Exercise (74249): 46  "minutes  Lateral step downs at 6\" step with R LE in stance 2x10 reps    Y balance lunges with R LE in stance x10 reps fwd, laterally & posteriorly each     Single leg RDL cone tap 3x10 reps, cues for neutral trunk positioning    Single leg squat with R LE in stance and L hand support 2x10     Verbally instructed pt in Impact/hop progression: hops in place, hops AP, hops lateral, etc     Current HEP:   Supine quad set + SLR  Heel prop knee extension stretch + quad set  Supine heel slides with strap  Standing TKEs with green TB   Wall slides  Tib anterior strengthening at wall   Calf raises off edge of step  Squats   Knee extension & HS curl & leg press at gym   Hip ADD stretch in kneeling  Y balance lunges   Single leg calf raise, heel off edge of step   Single leg RDL   "

## 2023-12-07 ENCOUNTER — TREATMENT (OUTPATIENT)
Dept: PHYSICAL THERAPY | Facility: CLINIC | Age: 43
End: 2023-12-07
Payer: COMMERCIAL

## 2023-12-07 DIAGNOSIS — M25.661 STIFFNESS OF RIGHT KNEE, NOT ELSEWHERE CLASSIFIED: Primary | ICD-10-CM

## 2023-12-07 DIAGNOSIS — M25.561 RIGHT KNEE PAIN, UNSPECIFIED CHRONICITY: ICD-10-CM

## 2023-12-07 PROCEDURE — 97110 THERAPEUTIC EXERCISES: CPT | Mod: GP

## 2023-12-07 ASSESSMENT — PAIN SCALES - GENERAL: PAINLEVEL_OUTOF10: 0 - NO PAIN

## 2023-12-07 ASSESSMENT — PAIN - FUNCTIONAL ASSESSMENT: PAIN_FUNCTIONAL_ASSESSMENT: 0-10

## 2023-12-17 NOTE — PROGRESS NOTES
Physical Therapy    Physical Therapy Treatment    Patient Name: Jin Watts  MRN: [id number]  Today's Date: 12/18/2023  Time Calculation  Start Time: 1021  Stop Time: 1100  Time Calculation (min): 39 min  Visit number: 11 (10 of 10)   10% coins, $3100 Ded, 30 PT/OT vs/yr      Assessment:   Pt tolerates session well without adverse effect and is making slow but steady progress toward goals. Per subjective, feels ready to get on skis. Does fatigue rapidly with impact related activities with R LE. Does report within session he tolerates impact activities better now compared to prior to surgery. He will continue to benefit from PT working on restoring ROM and strength to R LE.     Plan:     As able:   Side planks   Split squats   Cybex single leg knee extension-- eccentric focus   Cybex HS curls  Cybex leg press     Current Problem  1. Stiffness of right knee, not elsewhere classified        2. Right knee pain, unspecified chronicity              Subjective   General   Pt arrives at session s/p R GLORIA unicondylar medial knee replacement 7/7/23 (5 months post-op)     Pt states he does feel ready to be on skis now for upcoming ski trip. Currently feels he is at his 50% overall function. No longer experiencing his OA flares since his partial knee surgery, but does generally feel slightly weak after surgery. Has times where his knee can feel very tight and sore, which did not occur prior to surgery.   Currently feels tight.       Precautions  Precautions  STEADI Fall Risk Score (The score of 4 or more indicates an increased risk of falling): 0  Pain  Pain Assessment: 0-10  Pain Score: 0 - No pain    Objective   Extremity/Trunk Assessment  R knee AROM: -1 to 132 deg    Dynamic valgus noted with hop progressions.     Treatments:  Therapeutic Exercise (17823): 39 minutes  Reviewed hop progression:   --In place x20, cues for dynamic valgus  --AP B x20  --Lateral B x20   --Single leg hop in place with UE support x10 reps with  R side     Wall squat, staggered foot position (R LE posterior) 3x10     Staggered foot position bridge, R LE posterior 3x10     Current HEP:   Supine quad set + SLR  Heel prop knee extension stretch + quad set  Supine heel slides with strap  Standing TKEs with green TB   Wall slides  Tib anterior strengthening at wall   Calf raises off edge of step  Squats   Knee extension & HS curl & leg press at gym   Hip ADD stretch in kneeling  Y balance lunges   Single leg calf raise, heel off edge of step   Single leg RDL

## 2023-12-18 ENCOUNTER — TREATMENT (OUTPATIENT)
Dept: PHYSICAL THERAPY | Facility: CLINIC | Age: 43
End: 2023-12-18
Payer: COMMERCIAL

## 2023-12-18 DIAGNOSIS — M25.661 STIFFNESS OF RIGHT KNEE, NOT ELSEWHERE CLASSIFIED: Primary | ICD-10-CM

## 2023-12-18 DIAGNOSIS — M25.561 RIGHT KNEE PAIN, UNSPECIFIED CHRONICITY: ICD-10-CM

## 2023-12-18 PROCEDURE — 97110 THERAPEUTIC EXERCISES: CPT | Mod: GP

## 2023-12-18 ASSESSMENT — PAIN - FUNCTIONAL ASSESSMENT: PAIN_FUNCTIONAL_ASSESSMENT: 0-10

## 2023-12-18 ASSESSMENT — PAIN SCALES - GENERAL: PAINLEVEL_OUTOF10: 0 - NO PAIN

## 2024-01-11 NOTE — PROGRESS NOTES
"Physical Therapy    Physical Therapy Treatment    Patient Name: Jin Watts  MRN: [id number]  Today's Date: 1/12/2024  Time Calculation  Start Time: 0751  Stop Time: 0830  Time Calculation (min): 39 min  Visit number: 12  10% coins, $3100 Ded, 30 PT/OT vs/yr      Assessment:   Pt tolerates session well without adverse effect and is making little progress toward goals. Per subjective, pt has not been working on HEP over the last 2-3 weeks. He was unable to progress exercises this date, and requires slightly more UE support with single leg strengthening. Fatigues rapidly within session. He will continue to benefit from PT working on restoring ROM and strength to R LE.     Plan:     As able:   Upright cycling warm up with resistance.   Lateral lunges   SLS ball toss   Side planks   Progressing hop progression.   Eventually more plyometric drills.   Cybex single leg knee extension-- eccentric focus   Cybex HS curls  Cybex leg press     Current Problem  1. Stiffness of right knee, not elsewhere classified        2. Right knee pain, unspecified chronicity            Subjective   General   Pt arrives at session s/p R GLORIA unicondylar medial knee replacement 7/7/23 (5 months post-op)     Pt states he was not able to go on ski trip yet, however does feel he is ready to begin low intensity skiing. Has not been working on his HEP since last visit. Is currently on off week from work and can begin to work on HEP again. Has not been going to the gym.     Precautions  Precautions  STEADI Fall Risk Score (The score of 4 or more indicates an increased risk of falling): 0  Pain  Pain Assessment: 0-10  Pain Score: 0 - No pain    Objective   Extremity/Trunk Assessment  R knee AROM: -1 to 132 deg    Dynamic valgus noted with hop progressions.     Treatments:  Therapeutic Exercise (63955): 39 minutes  Lateral step downs at 6\" step with R LE 3x10-15 reps     Single leg RDL's with R LE in stance 3x10-15 reps. Pt performed without shoes. "     Y balance lunges with furniture slider, R LE in stance 2x10 reps     Split squats with unilateral UE support 2x10 reps B     NOT TODAY:   Hop progression:   --In place x20, cues for dynamic valgus  --AP B x20  --Lateral B x20   --Single leg hop in place with UE support x10 reps with R side     Current HEP:   Supine quad set + SLR  Heel prop knee extension stretch + quad set  Supine heel slides with strap  Standing TKEs with green TB   Wall slides  Tib anterior strengthening at wall   Calf raises off edge of step  Squats   Knee extension & HS curl & leg press at gym   Hip ADD stretch in kneeling  Y balance lunges   Single leg calf raise, heel off edge of step   Single leg RDL

## 2024-01-12 ENCOUNTER — TREATMENT (OUTPATIENT)
Dept: PHYSICAL THERAPY | Facility: CLINIC | Age: 44
End: 2024-01-12
Payer: COMMERCIAL

## 2024-01-12 DIAGNOSIS — M25.561 RIGHT KNEE PAIN, UNSPECIFIED CHRONICITY: ICD-10-CM

## 2024-01-12 DIAGNOSIS — M25.661 STIFFNESS OF RIGHT KNEE, NOT ELSEWHERE CLASSIFIED: Primary | ICD-10-CM

## 2024-01-12 PROCEDURE — 97110 THERAPEUTIC EXERCISES: CPT | Mod: GP

## 2024-01-12 ASSESSMENT — PAIN - FUNCTIONAL ASSESSMENT: PAIN_FUNCTIONAL_ASSESSMENT: 0-10

## 2024-01-12 ASSESSMENT — PAIN SCALES - GENERAL: PAINLEVEL_OUTOF10: 0 - NO PAIN

## 2024-01-16 NOTE — PROGRESS NOTES
"Physical Therapy    Physical Therapy Treatment    Patient Name: Jin Watts  MRN: [id number]  Today's Date: 1/17/2024  Time Calculation  Start Time: 1208  Stop Time: 1250  Time Calculation (min): 42 min  Visit number: 13 (2 of 30 this year)   10% coins, $3100 Ded, 30 PT/OT vs/yr      Assessment:   Pt tolerates session well without adverse effect and is making slow progress toward goals. Pt tolerates session better this visit compared to last, and is without soreness from last visit. Unable to progress, however is tolerating exercises at same intensity well. He will continue to benefit from PT working on restoring ROM and strength to R LE, however pt is going to work on consistency of HEP before returning for next appt for progressions.     Plan:   Side planks   Progressing hop progression.   Eventually more plyometric drills.   As able:   Cybex single leg knee extension-- eccentric focus   Cybex HS curls  Cybex leg press     Current Problem  1. Stiffness of right knee, not elsewhere classified        2. Right knee pain, unspecified chronicity            Subjective   General   Pt arrives at session s/p R GLORIA unicondylar medial knee replacement 7/7/23 (6 months post-op)     Pt states no new events. Arrives at session from work. No soreness after last visit. Over the last few days has not been experiencing pain. Does report inactivity is when his knee and back feel the worst.     Precautions  Precautions  STEADI Fall Risk Score (The score of 4 or more indicates an increased risk of falling): 0  Pain  Pain Assessment: 0-10  Pain Score: 0 - No pain    Objective   Extremity/Trunk Assessment  R knee AROM: -1 to 132 deg    Dynamic valgus noted with hop progressions.     Treatments:  Therapeutic Exercise (51415): 42 minutes  Upright cycling warm up with resistance level 3-4 x8 min    Lateral step downs at 6\" step with R LE 2x12-15 reps     Lateral lunges 2x15 reps with R LE    Single leg RDL's with R LE in stance 2x10-15 " reps. Pt performed without shoes.     Y balance lunges with furniture slider, R LE in stance x10 reps     SLS ball toss 3x10 reps, performed anterior & laterally     S/L clamshells with TB 2x10     Verbally Reviewed Hop progression:   --In place   --AP B   --Lateral B   --Single leg hop in place with UE support     Split squats with unilateral UE support 2x10 reps B--NOT TODAY    Current HEP:   Tib anterior strengthening at wall   Calf raises off edge of step  Squats   Knee extension & HS curl & leg press at gym   Hip ADD stretch in kneeling  Y balance lunges   Single leg calf raise, heel off edge of step   Single leg RDL

## 2024-01-17 ENCOUNTER — TREATMENT (OUTPATIENT)
Dept: PHYSICAL THERAPY | Facility: CLINIC | Age: 44
End: 2024-01-17
Payer: COMMERCIAL

## 2024-01-17 DIAGNOSIS — M25.561 RIGHT KNEE PAIN, UNSPECIFIED CHRONICITY: ICD-10-CM

## 2024-01-17 DIAGNOSIS — M25.661 STIFFNESS OF RIGHT KNEE, NOT ELSEWHERE CLASSIFIED: Primary | ICD-10-CM

## 2024-01-17 PROCEDURE — 97110 THERAPEUTIC EXERCISES: CPT | Mod: GP

## 2024-01-17 ASSESSMENT — PAIN - FUNCTIONAL ASSESSMENT: PAIN_FUNCTIONAL_ASSESSMENT: 0-10

## 2024-01-17 ASSESSMENT — PAIN SCALES - GENERAL: PAINLEVEL_OUTOF10: 0 - NO PAIN

## 2025-05-23 ENCOUNTER — APPOINTMENT (OUTPATIENT)
Dept: OPHTHALMOLOGY | Facility: CLINIC | Age: 45
End: 2025-05-23
Payer: COMMERCIAL

## 2025-05-29 ENCOUNTER — APPOINTMENT (OUTPATIENT)
Dept: OPHTHALMOLOGY | Facility: CLINIC | Age: 45
End: 2025-05-29
Payer: COMMERCIAL

## 2025-05-29 DIAGNOSIS — H15.102 EPISCLERITIS OF LEFT EYE: Primary | ICD-10-CM

## 2025-05-29 DIAGNOSIS — Z86.69 H/O RETINAL DETACHMENT: ICD-10-CM

## 2025-05-29 DIAGNOSIS — H35.411 LATTICE DEGENERATION OF RIGHT RETINA: ICD-10-CM

## 2025-05-29 PROCEDURE — 92134 CPTRZ OPH DX IMG PST SGM RTA: CPT | Performed by: OPHTHALMOLOGY

## 2025-05-29 PROCEDURE — 99204 OFFICE O/P NEW MOD 45 MIN: CPT | Performed by: OPHTHALMOLOGY

## 2025-05-29 RX ORDER — PREDNISOLONE ACETATE 10 MG/ML
1 SUSPENSION/ DROPS OPHTHALMIC 4 TIMES DAILY
Qty: 5 ML | Refills: 1 | Status: SHIPPED | OUTPATIENT
Start: 2025-05-29 | End: 2025-06-28

## 2025-05-29 RX ORDER — DEXTROAMPHETAMINE SACCHARATE, AMPHETAMINE ASPARTATE, DEXTROAMPHETAMINE SULFATE AND AMPHETAMINE SULFATE 2.5; 2.5; 2.5; 2.5 MG/1; MG/1; MG/1; MG/1
1 TABLET ORAL
COMMUNITY
Start: 2025-05-07

## 2025-05-29 RX ORDER — ZOLPIDEM TARTRATE 10 MG/1
TABLET ORAL
COMMUNITY
Start: 2024-12-17

## 2025-05-29 ASSESSMENT — ENCOUNTER SYMPTOMS
EYES NEGATIVE: 1
ENDOCRINE NEGATIVE: 0
MUSCULOSKELETAL NEGATIVE: 0
RESPIRATORY NEGATIVE: 0
GASTROINTESTINAL NEGATIVE: 0
ALLERGIC/IMMUNOLOGIC NEGATIVE: 0
PSYCHIATRIC NEGATIVE: 0
HEMATOLOGIC/LYMPHATIC NEGATIVE: 0
CONSTITUTIONAL NEGATIVE: 0
NEUROLOGICAL NEGATIVE: 0
CARDIOVASCULAR NEGATIVE: 0

## 2025-05-29 ASSESSMENT — CONF VISUAL FIELD
OS_NORMAL: 1
OD_INFERIOR_NASAL_RESTRICTION: 0
OD_SUPERIOR_NASAL_RESTRICTION: 0
OS_SUPERIOR_NASAL_RESTRICTION: 0
OD_INFERIOR_TEMPORAL_RESTRICTION: 0
OS_INFERIOR_NASAL_RESTRICTION: 0
OS_INFERIOR_TEMPORAL_RESTRICTION: 0
OD_SUPERIOR_TEMPORAL_RESTRICTION: 0
OD_NORMAL: 1
OS_SUPERIOR_TEMPORAL_RESTRICTION: 0

## 2025-05-29 ASSESSMENT — TONOMETRY
IOP_METHOD: GOLDMANN APPLANATION
OD_IOP_MMHG: 19
OS_IOP_MMHG: 20

## 2025-05-29 ASSESSMENT — CUP TO DISC RATIO
OD_RATIO: .4
OS_RATIO: .4

## 2025-05-29 ASSESSMENT — VISUAL ACUITY
OD_CC: 20/20
METHOD: SNELLEN - LINEAR
OS_CC: 20/20

## 2025-05-29 ASSESSMENT — SLIT LAMP EXAM - LIDS
COMMENTS: GOOD POSITION
COMMENTS: GOOD POSITION

## 2025-05-29 ASSESSMENT — EXTERNAL EXAM - RIGHT EYE: OD_EXAM: NORMAL

## 2025-05-29 ASSESSMENT — EXTERNAL EXAM - LEFT EYE: OS_EXAM: NORMAL

## 2025-05-29 NOTE — PROGRESS NOTES
Assessment/Plan   Diagnoses and all orders for this visit:  H/O retinal detachment  -     OCT, Retina - OU - Both Eyes  -     Color Fundus Photography - OU - Both Eyes  Lattice degeneration of right retina  Episcleritis of left eye      44yom New patient.    History of retinal detachment OS. 2008 status post (s/p) cryo/scleral buckle.   - also status post (s/p) laser retinopexy OU    Baseline Optos OU (5/29/25)   OD: attached, temporal WWOP   OS:  macular subretinal fibrosis, temporal/inferotemporal cryo scarring, appears attached    DIAGNOSTIC PROCEDURE DONE 05/29/25  OCT DONE OD/OS  REASON FOR TEST: will help address and tailor  therapy by detecting subclinical CME SRF   OCT OD - Normal Foveal Contour, No Edema, IS/OS Junction Normal  OCT OS - Normal Foveal Contour, No Edema, IS/OS Junction Normal    additional commnents:       Here today for intermittent episodes os of redness, chemosis, discomfort and blurred vision  Patient provided photos on cell phone of nasal bulbar conjunctival injection    - no evidence of buckle extrusion but temporal buckle edge high anterior left temporal    Suspect episcleritis, may be due to underlying buckle  Start pred QID OS with weekly taper

## 2025-06-27 ENCOUNTER — APPOINTMENT (OUTPATIENT)
Dept: OPHTHALMOLOGY | Facility: CLINIC | Age: 45
End: 2025-06-27
Payer: COMMERCIAL

## 2025-06-30 ENCOUNTER — APPOINTMENT (OUTPATIENT)
Dept: OPHTHALMOLOGY | Facility: CLINIC | Age: 45
End: 2025-06-30
Payer: COMMERCIAL

## 2025-06-30 DIAGNOSIS — H35.411 LATTICE DEGENERATION OF RIGHT RETINA: ICD-10-CM

## 2025-06-30 DIAGNOSIS — Z86.69 H/O RETINAL DETACHMENT: ICD-10-CM

## 2025-06-30 DIAGNOSIS — H15.102 EPISCLERITIS OF LEFT EYE: Primary | ICD-10-CM

## 2025-06-30 PROCEDURE — 92134 CPTRZ OPH DX IMG PST SGM RTA: CPT | Mod: BILATERAL PROCEDURE | Performed by: OPHTHALMOLOGY

## 2025-06-30 PROCEDURE — 99213 OFFICE O/P EST LOW 20 MIN: CPT | Performed by: OPHTHALMOLOGY

## 2025-06-30 ASSESSMENT — CONF VISUAL FIELD
OD_INFERIOR_TEMPORAL_RESTRICTION: 0
METHOD: COUNTING FINGERS
OD_INFERIOR_NASAL_RESTRICTION: 0
OS_INFERIOR_TEMPORAL_RESTRICTION: 0
OS_INFERIOR_NASAL_RESTRICTION: 0
OS_NORMAL: 1
OD_SUPERIOR_TEMPORAL_RESTRICTION: 0
OS_SUPERIOR_NASAL_RESTRICTION: 0
OS_SUPERIOR_TEMPORAL_RESTRICTION: 0
OD_SUPERIOR_NASAL_RESTRICTION: 0
OD_NORMAL: 1

## 2025-06-30 ASSESSMENT — ENCOUNTER SYMPTOMS
NEUROLOGICAL NEGATIVE: 0
MUSCULOSKELETAL NEGATIVE: 0
PSYCHIATRIC NEGATIVE: 0
HEMATOLOGIC/LYMPHATIC NEGATIVE: 0
RESPIRATORY NEGATIVE: 0
CONSTITUTIONAL NEGATIVE: 0
GASTROINTESTINAL NEGATIVE: 0
ENDOCRINE NEGATIVE: 0
EYES NEGATIVE: 1
CARDIOVASCULAR NEGATIVE: 0
ALLERGIC/IMMUNOLOGIC NEGATIVE: 0

## 2025-06-30 ASSESSMENT — CUP TO DISC RATIO
OS_RATIO: .4
OD_RATIO: .4

## 2025-06-30 ASSESSMENT — SLIT LAMP EXAM - LIDS
COMMENTS: GOOD POSITION
COMMENTS: GOOD POSITION

## 2025-06-30 ASSESSMENT — VISUAL ACUITY
CORRECTION_TYPE: GLASSES
OS_CC: 20/20
METHOD: SNELLEN - LINEAR
OD_CC: 20/20-1

## 2025-06-30 ASSESSMENT — TONOMETRY
OD_IOP_MMHG: 18
IOP_METHOD: GOLDMANN APPLANATION
OS_IOP_MMHG: 16

## 2025-06-30 ASSESSMENT — EXTERNAL EXAM - RIGHT EYE: OD_EXAM: NORMAL

## 2025-06-30 ASSESSMENT — EXTERNAL EXAM - LEFT EYE: OS_EXAM: NORMAL

## 2025-06-30 NOTE — PROGRESS NOTES
Assessment/Plan   Diagnoses and all orders for this visit:  Episcleritis of left eye  -     OCT, Retina - OU - Both Eyes  Lattice degeneration of right retina  -     OCT, Retina - OU - Both Eyes  H/O retinal detachment  -     OCT, Retina - OU - Both Eyes    44yom patient.    History of retinal detachment OS. 2008 status post (s/p) cryo/scleral buckle.   - also status post (s/p) laser retinopexy OU    DIAGNOSTIC PROCEDURE DONE  OCT DONE OD/OS  REASON FOR TEST: will help address and tailor  therapy by detecting subclinical CME SRF   Hi quality OCT  scans obtained  signal good    OCT OD - Normal Foveal Contour, No Edema, IS/OS Junction Normal  OCT OS - Normal Foveal Contour, No Edema, IS/OS Junction Normal      Here today for intermittent episodes os of redness, chemosis, discomfort and blurred vision  Patient provided prior photos on cell phone of nasal bulbar conjunctival injection    - no evidence of buckle extrusion but temporal buckle edge high anterior left temporal    Suspect episcleritis, may be due to underlying buckle  Off pred taper, can use prn for episodes of injections    Fu 4m

## 2025-12-01 ENCOUNTER — APPOINTMENT (OUTPATIENT)
Dept: OPHTHALMOLOGY | Facility: CLINIC | Age: 45
End: 2025-12-01
Payer: COMMERCIAL